# Patient Record
Sex: FEMALE | Race: WHITE
[De-identification: names, ages, dates, MRNs, and addresses within clinical notes are randomized per-mention and may not be internally consistent; named-entity substitution may affect disease eponyms.]

---

## 2019-09-03 ENCOUNTER — HOSPITAL ENCOUNTER (OUTPATIENT)
Dept: HOSPITAL 92 - BICMAMMO | Age: 64
Discharge: HOME | End: 2019-09-03
Attending: INTERNAL MEDICINE
Payer: COMMERCIAL

## 2019-09-03 DIAGNOSIS — Z98.82: ICD-10-CM

## 2019-09-03 DIAGNOSIS — N64.89: ICD-10-CM

## 2019-09-03 DIAGNOSIS — Z12.31: Primary | ICD-10-CM

## 2019-09-03 PROCEDURE — 77063 BREAST TOMOSYNTHESIS BI: CPT

## 2019-09-03 PROCEDURE — 77067 SCR MAMMO BI INCL CAD: CPT

## 2019-09-03 NOTE — MMO
Bilateral MAMMO Bilat Screen [facility].

 

CLINICAL HISTORY:

Patient is 64 years old and is seen for screening. The patient has no family

history of breast cancer.  The patient has no personal history of cancer. The

patient has a history of Implants in 1990.

 

VIEWS:

The views performed were:  bilateral craniocaudal with tomosynthesis; bilateral

mediolateral oblique with tomosynthesis; and bilateral Implant displaced with

tomosynthesis.

 

FILMS COMPARED:

The present examination has been compared to prior imaging studies performed at

[facility] on 08/01/2017 and 08/28/2018, and at [facility] on

09/05/2018.

 

MAMMOGRAM FINDINGS:

There are scattered fibroglandular densities.

 

There is a focal asymmetry seen in the inner region of the right breast.

 

In the left breast, there are no suspicious masses, calcifications or areas of

architectural distortion.

 

IMPRESSION:

FOCAL ASYMMETRY IN THE RIGHT BREAST REQUIRES ADDITIONAL EVALUATION. ADDITIONAL

PROJECTIONS (RIGHT CRANIOCAUDAL MAGNIFICATION; RIGHT CRANIOCAUDAL SPOT

COMPRESSION WITH TOMOSYNTHESIS; AND RIGHT MEDIOLATERAL OBLIQUE SPOT COMPRESSION

WITH TOMOSYNTHESIS) ARE RECOMMENDED.

 

THE RESULTS OF THIS EXAM WERE SENT TO THE PATIENT.

 

ACR BI-RADS Category 0 - Incomplete:  Need additional imaging evaluation. [facility] will notify the patient of the need for additional imaging services.

 

MAMMOGRAPHY NOTE:

 1. A negative mammogram report should not delay a biopsy if a dominant of

 clinically suspicious mass is present.

 2. Approximately 10% to 15% of breast cancers are not detected by

 mammography.

 3. Adenosis and dense breasts may obscure an underlying neoplasm.

 

 

Reported by: YI STARKS MD

Electonically Signed: [id number]

## 2019-09-10 ENCOUNTER — HOSPITAL ENCOUNTER (OUTPATIENT)
Dept: HOSPITAL 92 - BICMAMMO | Age: 64
Discharge: HOME | End: 2019-09-10
Attending: INTERNAL MEDICINE
Payer: COMMERCIAL

## 2019-09-10 DIAGNOSIS — R92.8: Primary | ICD-10-CM

## 2019-09-10 DIAGNOSIS — Z98.82: ICD-10-CM

## 2019-09-10 DIAGNOSIS — N63.10: ICD-10-CM

## 2019-09-10 PROCEDURE — G0279 TOMOSYNTHESIS, MAMMO: HCPCS

## 2019-09-10 NOTE — ULT
RIGHT BREAST DIAGNOSTIC ULTRASOUND:

 

Date:  09/10/19 

 

INDICATION:

Follow-up mass in the right breast seen on the diagnostic mammogram performed on 09/10/19. 

 

FINDINGS:

Corresponding to the small irregular mass seen within the inner region of the right breast is a hypoe
choic, irregular, partially shadowing mass within the right breast 2 o'clock position, 3.0 cm from th
e nipple. The lesion measures approximately 3.0 x 2.0 x 3.0 mm in size. 

 

IMPRESSION: 

BIRADS Category 4 - Suspicious abnormality. 

 

Recommend ultrasound guided core biopsy of the suspicious mass in the right breast 2 o'clock position
 3.0 cm from the nipple. The patient was counseled on the findings prior to leaving the breast center
. 

 

 

POS: OFF

## 2019-09-10 NOTE — MMO
Right Breast MAMMO Unilat Diag [facility].

 

CLINICAL HISTORY:

Patient is 64 years old and is seen for additional evaluation requested from

prior study. The patient has no family history of breast cancer.  The patient

has no personal history of cancer. The patient has a history of Implants in 1990.

 

VIEWS:

The views performed were:  right craniocaudal spot compression with

tomosynthesis; right mediolateral oblique spot compression with tomosynthesis;

right mediolateral; right mediolateral with tomosynthesis; and right Implant

displaced with tomosynthesis.

 

FILMS COMPARED:

The present examination has been compared to prior imaging studies performed at

[facility] on 08/28/2018, and at [facility] on 09/05/2018,

09/03/2019 and 09/10/2019.

 

MAMMOGRAM FINDINGS:

There are scattered fibroglandular densities.

 

Additional evaluation was performed for the focal asymmetry in the right breast,

inner region seen on 09/03/2019. On the present examination, there is an

irregular mass measuring 4 millimeters in the right breast at 3 o'clock.

 

IMPRESSION:

MASS IN THE RIGHT BREAST IS SUSPICIOUS. AN ULTRASOUND-GUIDED BREAST BIOPSY IS

RECOMMENDED.

 

THE FINDINGS AND RECOMMENDATIONS WERE DISCUSSED WITH THE PATIENT PRIOR TO HER

LEAVING THE CENTER.

 

THE RESULTS OF THIS EXAM WERE SENT TO THE PATIENT.

 

ACR BI-RADS Category 4 - Suspicious abnormality - biopsy should be considered

 

MAMMOGRAPHY NOTE:

 1. A negative mammogram report should not delay a biopsy if a dominant of

 clinically suspicious mass is present.

 2. Approximately 10% to 15% of breast cancers are not detected by

 mammography.

 3. Adenosis and dense breasts may obscure an underlying neoplasm.

 

 

Reported by: PAUL CLEMENTS MD

Electonically Signed: [id number]

## 2019-09-23 ENCOUNTER — HOSPITAL ENCOUNTER (OUTPATIENT)
Dept: HOSPITAL 92 - BICULT | Age: 64
End: 2019-09-23
Attending: INTERNAL MEDICINE
Payer: COMMERCIAL

## 2019-09-23 DIAGNOSIS — C50.211: Primary | ICD-10-CM

## 2019-09-23 PROCEDURE — 19083 BX BREAST 1ST LESION US IMAG: CPT

## 2019-09-23 PROCEDURE — 0HBT3ZX EXCISION OF RIGHT BREAST, PERCUTANEOUS APPROACH, DIAGNOSTIC: ICD-10-PCS | Performed by: RADIOLOGY

## 2019-09-23 PROCEDURE — 88305 TISSUE EXAM BY PATHOLOGIST: CPT

## 2019-09-23 NOTE — ULT
ULTRASOUND GUIDED RIGHT BREAST MASS BIOPSY:



INDICATIONS:

Suspicious mass lesion within the right breast 2:00 o'clock position, 3 cm from the nipple.



TECHNIQUE:

Informed consent was obtained.  Pre-procedure ultrasound identified the suspicious lesion in the righ
t breast 2:00 o'clock position, 3 cm from the nipple, measuring 3.6 mm.  The lesion is

predominantly hypoechoic with irregular margins and posterior acoustic shadowing.  



The site overlying this region was prepped and draped in usual sterile fashion.  Buffered 1% lidocain
e was measured overlying the subcutaneous tissues.  A small dermatotomy was made within the skin of

the right breast.  A 14 gauge core biopsy device was then guided down to the lesion. Three separate c
ore samples were obtained of the lesion.  Following the third sample, a biopsy clip was placed

adjacent to the mass lesion.  Pressure was held at the biopsy site until hemostasis was obtained.  Th
e patient did have a small amount of hemorrhage that developed in the biopsy tract and biopsy site

during the sampling.  The patient tolerated the biopsy without difficulty.



The patient is to have a follow-up right breast mammogram to verify clip deployment.



IMPRESSION:

BIRADS Category 4-Suspicious abnormality.  



Status post ultrasound guided core biopsy of the right breast mass in the 2:00 o'clock position, 3 cm
 the nipple.  Awaiting pathology results.



Transcribed Date/Time: 9/23/2019 2:36 PM



Reported By: Malcolm Sanchez 

Electronically Signed:  9/23/2019 3:15 PM

## 2019-09-25 NOTE — MMO
Right Breast MAMMO Unilat Diag [facility].

 

CLINICAL HISTORY:

Patient is 64 years old and is seen for diagnostic exam. The patient has no

family history of breast cancer.  The patient has no personal history of cancer.

The patient has a history of Implants in 1990.

 

VIEWS:

The views performed were:  right craniocaudal and right mediolateral oblique.

 

FILMS COMPARED:

The present examination has been compared to prior imaging studies performed at

[facility] on 09/05/2018, 09/03/2019 and 09/10/2019.

 

This study has been interpreted with the assistance of computer-aided detection.

 

MAMMOGRAM FINDINGS:

There are scattered fibroglandular densities.

 

There is a new biopsy clip seen in the right breast at 3 o'clock.

 

The biopsy clip is in the region of the suspcious mass seen in the right breast

2:00 position.  Small hematoma is seen at the biopsy site.

 

IMPRESSION:

NEW BIOPSY CLIP IN THE RIGHT BREAST IS SUSPICIOUS.

 

THE RESULTS OF THIS EXAM WERE SENT TO THE PATIENT.

 

ACR BI-RADS Category 4 - Suspicious abnormality - biopsy should be considered

 

MAMMOGRAPHY NOTE:

 1. A negative mammogram report should not delay a biopsy if a dominant of

 clinically suspicious mass is present.

 2. Approximately 10% to 15% of breast cancers are not detected by

 mammography.

 3. Adenosis and dense breasts may obscure an underlying neoplasm.

 

 

Reported by: PAUL CLEMENTS MD

Electonically Signed: [id number]